# Patient Record
Sex: FEMALE | Race: WHITE
[De-identification: names, ages, dates, MRNs, and addresses within clinical notes are randomized per-mention and may not be internally consistent; named-entity substitution may affect disease eponyms.]

---

## 2021-11-16 ENCOUNTER — HOSPITAL ENCOUNTER (EMERGENCY)
Dept: HOSPITAL 76 - ED | Age: 26
Discharge: HOME | End: 2021-11-16
Payer: COMMERCIAL

## 2021-11-16 VITALS — DIASTOLIC BLOOD PRESSURE: 67 MMHG | SYSTOLIC BLOOD PRESSURE: 123 MMHG

## 2021-11-16 DIAGNOSIS — Z3A.01: ICD-10-CM

## 2021-11-16 DIAGNOSIS — O20.0: Primary | ICD-10-CM

## 2021-11-16 LAB
ALBUMIN DIAFP-MCNC: 4.9 G/DL (ref 3.2–5.5)
ALBUMIN/GLOB SERPL: 1.6 {RATIO} (ref 1–2.2)
ALP SERPL-CCNC: 52 IU/L (ref 42–121)
ALT SERPL W P-5'-P-CCNC: 11 IU/L (ref 10–60)
ANION GAP SERPL CALCULATED.4IONS-SCNC: 10 MMOL/L (ref 6–13)
AST SERPL W P-5'-P-CCNC: 14 IU/L (ref 10–42)
BASOPHILS NFR BLD AUTO: 0.1 10^3/UL (ref 0–0.1)
BASOPHILS NFR BLD AUTO: 0.6 %
BILIRUB BLD-MCNC: 0.3 MG/DL (ref 0.2–1)
BUN SERPL-MCNC: 10 MG/DL (ref 6–20)
CALCIUM UR-MCNC: 9.4 MG/DL (ref 8.5–10.3)
CHLORIDE SERPL-SCNC: 107 MMOL/L (ref 101–111)
CLARITY UR REFRACT.AUTO: CLEAR
CO2 SERPL-SCNC: 23 MMOL/L (ref 21–32)
CREAT SERPLBLD-SCNC: 0.9 MG/DL (ref 0.4–1)
EOSINOPHIL # BLD AUTO: 0.1 10^3/UL (ref 0–0.7)
EOSINOPHIL NFR BLD AUTO: 1 %
ERYTHROCYTE [DISTWIDTH] IN BLOOD BY AUTOMATED COUNT: 12.7 % (ref 12–15)
GFRSERPLBLD MDRD-ARVRAT: 76 ML/MIN/{1.73_M2} (ref 89–?)
GLOBULIN SER-MCNC: 3.1 G/DL (ref 2.1–4.2)
GLUCOSE SERPL-MCNC: 107 MG/DL (ref 70–100)
GLUCOSE UR QL STRIP.AUTO: NEGATIVE MG/DL
HCG UR QL: POSITIVE
HCT VFR BLD AUTO: 42.7 % (ref 37–47)
HGB UR QL STRIP: 14 G/DL (ref 12–16)
KETONES UR QL STRIP.AUTO: NEGATIVE MG/DL
LIPASE SERPL-CCNC: 32 U/L (ref 22–51)
LYMPHOCYTES # SPEC AUTO: 2.7 10^3/UL (ref 1.5–3.5)
LYMPHOCYTES NFR BLD AUTO: 26.8 %
MCH RBC QN AUTO: 29.4 PG (ref 27–31)
MCHC RBC AUTO-ENTMCNC: 32.8 G/DL (ref 32–36)
MCV RBC AUTO: 89.7 FL (ref 81–99)
MONOCYTES # BLD AUTO: 0.7 10^3/UL (ref 0–1)
MONOCYTES NFR BLD AUTO: 6.8 %
NEUTROPHILS # BLD AUTO: 6.5 10^3/UL (ref 1.5–6.6)
NEUTROPHILS # SNV AUTO: 10 X10^3/UL (ref 4.8–10.8)
NEUTROPHILS NFR BLD AUTO: 64.4 %
NITRITE UR QL STRIP.AUTO: NEGATIVE
NRBC # BLD AUTO: 0 /100WBC
NRBC # BLD AUTO: 0 X10^3/UL
PDW BLD AUTO: 8.9 FL (ref 7.9–10.8)
PH UR STRIP.AUTO: 6 PH (ref 5–7.5)
PLATELET # BLD: 231 10^3/UL (ref 130–450)
POTASSIUM SERPL-SCNC: 3.9 MMOL/L (ref 3.5–5)
PROT SPEC-MCNC: 8 G/DL (ref 6.7–8.2)
PROT UR STRIP.AUTO-MCNC: NEGATIVE MG/DL
RBC # UR STRIP.AUTO: (no result) /UL
RBC # URNS HPF: (no result) /HPF (ref 0–5)
RBC MAR: 4.76 10^6/UL (ref 4.2–5.4)
SODIUM SERPLBLD-SCNC: 140 MMOL/L (ref 135–145)
SP GR UR STRIP.AUTO: 1.02 (ref 1–1.03)
SQUAMOUS URNS QL MICRO: (no result)
UROBILINOGEN UR QL STRIP.AUTO: (no result) E.U./DL
UROBILINOGEN UR STRIP.AUTO-MCNC: NEGATIVE MG/DL
WBC # UR MANUAL: (no result) /HPF (ref 0–5)

## 2021-11-16 PROCEDURE — 36415 COLL VENOUS BLD VENIPUNCTURE: CPT

## 2021-11-16 PROCEDURE — 81025 URINE PREGNANCY TEST: CPT

## 2021-11-16 PROCEDURE — 85025 COMPLETE CBC W/AUTO DIFF WBC: CPT

## 2021-11-16 PROCEDURE — 99283 EMERGENCY DEPT VISIT LOW MDM: CPT

## 2021-11-16 PROCEDURE — 86900 BLOOD TYPING SEROLOGIC ABO: CPT

## 2021-11-16 PROCEDURE — 80053 COMPREHEN METABOLIC PANEL: CPT

## 2021-11-16 PROCEDURE — 81001 URINALYSIS AUTO W/SCOPE: CPT

## 2021-11-16 PROCEDURE — 99284 EMERGENCY DEPT VISIT MOD MDM: CPT

## 2021-11-16 PROCEDURE — 83690 ASSAY OF LIPASE: CPT

## 2021-11-16 PROCEDURE — 86901 BLOOD TYPING SEROLOGIC RH(D): CPT

## 2021-11-16 PROCEDURE — 87086 URINE CULTURE/COLONY COUNT: CPT

## 2021-11-16 PROCEDURE — 81003 URINALYSIS AUTO W/O SCOPE: CPT

## 2021-11-16 PROCEDURE — 84702 CHORIONIC GONADOTROPIN TEST: CPT

## 2021-11-16 NOTE — ED PHYSICIAN DOCUMENTATION
PD HPI FEMALE 





- Stated complaint


Stated Complaint: FEMALE 





- Chief complaint


Chief Complaint: Abd Pain





- History obtained from


History obtained from: Patient





- History of Present Illness


Timing - onset: Yesterday


Timing - duration: Days (1 1/2)


Timing - details: Abrupt onset, Still present (Taliahe had onset of some 

abdominal/pelvic cramping pain associated with bright vaginal bleeding which was

moderate amount yesterday afternoon and again in the evening.  Another episode 

this morning.  No persistent pain. She is 6 wks EGA pregnant.), Waxing and 

waning


Associated symptoms: Pelvic pain (cramping intermittently), Vaginal bleeding.  

No: Fever, Back pain, Vaginal discharge


Contributing factors: Pregnant


OB-GYN History: G (1), P (0)


Similar symptoms before: Has not had sx before


Recently seen: Not recently seen





Review of Systems


Constitutional: denies: Fever, Chills


Nose: denies: Rhinorrhea / runny nose, Congestion


Throat: denies: Sore throat


Respiratory: denies: Cough


GI: denies: Nausea, Vomiting


: reports: Now pregnant EGA (6 weeks).  denies: Dysuria, Discharge


Musculoskeletal: denies: Neck pain, Back pain


Neurologic: denies: Generalized weakness, Near syncope





PD PAST MEDICAL HISTORY





- Past Medical History


Past Medical History: No





- Present Medications


Home Medications: 


                                Ambulatory Orders











 Medication  Instructions  Recorded  Confirmed


 


No Known Home Medications  11/16/21 11/16/21














- Allergies


Allergies/Adverse Reactions: 


                                    Allergies











Allergy/AdvReac Type Severity Reaction Status Date / Time


 


No Known Drug Allergies Allergy   Verified 11/16/21 13:03














PD ED PE NORMAL





- Vitals


Vital signs reviewed: Yes





- General


General: Alert and oriented X 3, No acute distress, Well developed/nourished





- Abdomen


Abdomen: Soft, Non tender





- Female 


Female : Deferred





- Rectal


Rectal: Deferred





- Back


Back: No CVA TTP





- Derm


Derm: Normal color, Warm and dry





- Neuro


Neuro: Alert and oriented X 3, No motor deficit, Normal speech





Results





- Vitals


Vitals: 


                               Vital Signs - 24 hr











  11/16/21





  13:03


 


Temperature 36.5 C


 


Heart Rate 73


 


Respiratory 16





Rate 


 


Blood Pressure 123/67


 


O2 Saturation 99








                                     Oxygen











O2 Source                      Room air

















- Labs


Labs: 


                                Laboratory Tests











  11/16/21 11/16/21 11/16/21





  13:15 13:15 13:15


 


WBC  10.0  


 


RBC  4.76  


 


Hgb  14.0  


 


Hct  42.7  


 


MCV  89.7  


 


MCH  29.4  


 


MCHC  32.8  


 


RDW  12.7  


 


Plt Count  231  


 


MPV  8.9  


 


Neut # (Auto)  6.5  


 


Lymph # (Auto)  2.7  


 


Mono # (Auto)  0.7  


 


Eos # (Auto)  0.1  


 


Baso # (Auto)  0.1  


 


Absolute Nucleated RBC  0.00  


 


Nucleated RBC %  0.0  


 


Sodium   140 


 


Potassium   3.9 


 


Chloride   107 


 


Carbon Dioxide   23 


 


Anion Gap   10.0 


 


BUN   10 


 


Creatinine   0.9 


 


Estimated GFR (MDRD)   76 L 


 


Glucose   107 H 


 


Calcium   9.4 


 


Total Bilirubin   0.3 


 


AST   14 


 


ALT   11 


 


Alkaline Phosphatase   52 


 


Total Protein   8.0 


 


Albumin   4.9 


 


Globulin   3.1 


 


Albumin/Globulin Ratio   1.6 


 


Lipase   32 


 


HCG, Quant    3586.00


 


Urine Color   


 


Urine Clarity   


 


Urine pH   


 


Ur Specific Gravity   


 


Urine Protein   


 


Urine Glucose (UA)   


 


Urine Ketones   


 


Urine Occult Blood   


 


Urine Nitrite   


 


Urine Bilirubin   


 


Urine Urobilinogen   


 


Ur Leukocyte Esterase   


 


Ur Microscopic Review   


 


Urine Culture Comments   


 


Urine HCG, Qual   


 


Blood Type   














  11/16/21 11/16/21





  13:24 14:22


 


WBC  


 


RBC  


 


Hgb  


 


Hct  


 


MCV  


 


MCH  


 


MCHC  


 


RDW  


 


Plt Count  


 


MPV  


 


Neut # (Auto)  


 


Lymph # (Auto)  


 


Mono # (Auto)  


 


Eos # (Auto)  


 


Baso # (Auto)  


 


Absolute Nucleated RBC  


 


Nucleated RBC %  


 


Sodium  


 


Potassium  


 


Chloride  


 


Carbon Dioxide  


 


Anion Gap  


 


BUN  


 


Creatinine  


 


Estimated GFR (MDRD)  


 


Glucose  


 


Calcium  


 


Total Bilirubin  


 


AST  


 


ALT  


 


Alkaline Phosphatase  


 


Total Protein  


 


Albumin  


 


Globulin  


 


Albumin/Globulin Ratio  


 


Lipase  


 


HCG, Quant  


 


Urine Color   YELLOW


 


Urine Clarity   CLEAR


 


Urine pH   6.0


 


Ur Specific Gravity   1.025


 


Urine Protein   NEGATIVE


 


Urine Glucose (UA)   NEGATIVE


 


Urine Ketones   NEGATIVE


 


Urine Occult Blood   MODERATE H


 


Urine Nitrite   NEGATIVE


 


Urine Bilirubin   NEGATIVE


 


Urine Urobilinogen   0.2 (NORMAL)


 


Ur Leukocyte Esterase   NEGATIVE


 


Ur Microscopic Review   INDICATED


 


Urine Culture Comments   Not Reportable


 


Urine HCG, Qual   POSITIVE


 


Blood Type  O NEGATIVE 














PD MEDICAL DECISION MAKING





- ED course


Complexity details: reviewed results, considered differential (concern for early

miscarriage, but eval for ectopic, etc. ), d/w patient, d/w consultant (I talked

with Dr. Ponce who is on-call for OB/GYN and will follow up with the patient in 

the next 2 to 3 days in the clinic.)





Departure





- Departure


Disposition: 01 Home, Self Care


Clinical Impression: 


 Threatened miscarriage, Pregnancy, Vaginal bleeding





Condition: Stable


Instructions:  ED Miscarriage Poss


Follow-Up: 


Miriam Ponce DO [Provider Admit Priv/Credential] - 


Comments: 


Stay well hydrated. Follow up with OB?GYN in next few days, call tomorrow for 

appt. Return if worse bleeding, lightheaded, fevers, worse pain, other concerns.

Tylenol if needed for pains.

## 2021-11-16 NOTE — ULTRASOUND REPORT
PROCEDURE:  OB First Trimester ultrasound

 

INDICATIONS:  Vaginal bleeding

 

OUTSIDE/PRIOR DATING DATA:  

Last menstrual period (LMP): 10/5/2021.  

LMP-based estimated date of delivery (MALATHI): 2022.  

First dating scan (date and location): 2021.  

Estimated date of delivery (MALATHI) from first dating scan: 2022.  

 

TECHNIQUE:  

Real-time transabdominal transvaginal scanning was performed of the fetus and maternal pelvic organs,
 with image documentation.  

 

COMPARISON:  None

 

FINDINGS:     Single intrauterine gestational sac containing fetal pole with crown-rump length measur
ing 0.3 cm, corresponding with a 5 week 6 day gestation. Normal-appearing yolk sac seen. No cardiac m
otion observed.

 

Gestational sac is positioned in the lower uterine segment, and there is a large perigestational blee
d superior gestational sac measuring 2.9 x 1.5 x 2.6 cm.

 

Right corpus luteum cyst measures 2.7 x 2.0 x 1.9 cm. Both ovaries have appropriate size and vascular
ity. Small amount of free fluid in the pelvis.

 

Measurement variability in dating:  +/- 4 weeks by LMP, +/- 7 days by mean sac diameter (use before 6
 weeks gestation if crown-rump length not able to be measured), +/- 5 days by crown-rump length (6-12
 weeks gestation).  

 

 

IMPRESSION:  

 

1. Single intrauterine pregnancy with fetal pole corresponding to 5 week 6 day gestation.

2. Large perigestational bleed, no cardiac motion, and gestational sac in the lower uterine segment a
re all consistent with threatened . Consider close interval follow-up.

 

Reviewed by: Sathish Palomo MD on 2021 2:50 PM AK

Approved by: Sathish Palomo MD on 2021 2:50 PM AKST

 

 

Station ID:  [id number]

## 2021-11-16 NOTE — ULTRASOUND REPORT
PROCEDURE:  OB First Trimester ultrasound

 

INDICATIONS:  Vaginal bleeding

 

OUTSIDE/PRIOR DATING DATA:  

Last menstrual period (LMP): 10/5/2021.  

LMP-based estimated date of delivery (MALATHI): 2022.  

First dating scan (date and location): 2021.  

Estimated date of delivery (MALATHI) from first dating scan: 2022.  

 

TECHNIQUE:  

Real-time transabdominal transvaginal scanning was performed of the fetus and maternal pelvic organs,
 with image documentation.  

 

COMPARISON:  None

 

FINDINGS:     Single intrauterine gestational sac containing fetal pole with crown-rump length measur
ing 0.3 cm, corresponding with a 5 week 6 day gestation. Normal-appearing yolk sac seen. No cardiac m
otion observed.

 

Gestational sac is positioned in the lower uterine segment, and there is a large perigestational blee
d superior gestational sac measuring 2.9 x 1.5 x 2.6 cm.

 

Right corpus luteum cyst measures 2.7 x 2.0 x 1.9 cm. Both ovaries have appropriate size and vascular
ity. Small amount of free fluid in the pelvis.

 

Measurement variability in dating:  +/- 4 weeks by LMP, +/- 7 days by mean sac diameter (use before 6
 weeks gestation if crown-rump length not able to be measured), +/- 5 days by crown-rump length (6-12
 weeks gestation).  

 

 

 

IMPRESSION:  

 

1. Single intrauterine pregnancy with fetal pole corresponding to 5 week 6 day gestation.

2. Large perigestational bleed, no cardiac motion, and gestational sac in the lower uterine segment a
re all consistent with threatened . Consider close interval follow-up.

 

Reviewed by: Sathish Palomo MD on 2021 2:49 PM AK

Approved by: Sathish Palomo MD on 2021 2:49 PM AKST

 

 

Station ID:  [id number]

## 2021-11-24 ENCOUNTER — HOSPITAL ENCOUNTER (OUTPATIENT)
Dept: HOSPITAL 76 - LAB | Age: 26
Discharge: HOME | End: 2021-11-24
Attending: ADVANCED PRACTICE MIDWIFE
Payer: COMMERCIAL

## 2021-11-24 DIAGNOSIS — O20.9: Primary | ICD-10-CM

## 2021-11-24 DIAGNOSIS — Z67.91: ICD-10-CM

## 2021-11-24 PROCEDURE — 84702 CHORIONIC GONADOTROPIN TEST: CPT

## 2021-11-24 PROCEDURE — 36415 COLL VENOUS BLD VENIPUNCTURE: CPT

## 2021-11-24 PROCEDURE — 86850 RBC ANTIBODY SCREEN: CPT

## 2021-11-27 ENCOUNTER — HOSPITAL ENCOUNTER (OUTPATIENT)
Dept: HOSPITAL 76 - LAB | Age: 26
Discharge: HOME | End: 2021-11-27
Attending: OBSTETRICS & GYNECOLOGY
Payer: COMMERCIAL

## 2021-11-27 DIAGNOSIS — O20.9: Primary | ICD-10-CM

## 2021-11-27 PROCEDURE — 36415 COLL VENOUS BLD VENIPUNCTURE: CPT

## 2021-11-27 PROCEDURE — 84702 CHORIONIC GONADOTROPIN TEST: CPT

## 2021-12-03 ENCOUNTER — HOSPITAL ENCOUNTER (OUTPATIENT)
Dept: HOSPITAL 76 - LAB | Age: 26
Discharge: HOME | End: 2021-12-03
Attending: OBSTETRICS & GYNECOLOGY
Payer: COMMERCIAL

## 2021-12-03 DIAGNOSIS — O20.9: Primary | ICD-10-CM

## 2021-12-03 PROCEDURE — 36415 COLL VENOUS BLD VENIPUNCTURE: CPT

## 2021-12-03 PROCEDURE — 84702 CHORIONIC GONADOTROPIN TEST: CPT
